# Patient Record
Sex: FEMALE | Race: WHITE | Employment: FULL TIME | URBAN - METROPOLITAN AREA
[De-identification: names, ages, dates, MRNs, and addresses within clinical notes are randomized per-mention and may not be internally consistent; named-entity substitution may affect disease eponyms.]

---

## 2017-01-16 ENCOUNTER — APPOINTMENT (OUTPATIENT)
Dept: GENERAL RADIOLOGY | Age: 47
End: 2017-01-16
Attending: EMERGENCY MEDICINE
Payer: COMMERCIAL

## 2017-01-16 ENCOUNTER — HOSPITAL ENCOUNTER (EMERGENCY)
Age: 47
Discharge: HOME | End: 2017-01-16
Attending: EMERGENCY MEDICINE
Payer: COMMERCIAL

## 2017-01-16 VITALS
OXYGEN SATURATION: 98 % | RESPIRATION RATE: 16 BRPM | BODY MASS INDEX: 22.49 KG/M2 | TEMPERATURE: 98 F | SYSTOLIC BLOOD PRESSURE: 142 MMHG | HEIGHT: 65 IN | WEIGHT: 135 LBS | HEART RATE: 95 BPM | DIASTOLIC BLOOD PRESSURE: 74 MMHG

## 2017-01-16 DIAGNOSIS — S80.02XA CONTUSION OF LEFT KNEE, INITIAL ENCOUNTER: ICD-10-CM
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
## 2017-01-16 DIAGNOSIS — S70.02XA CONTUSION OF LEFT HIP, INITIAL ENCOUNTER: ICD-10-CM

## 2017-01-16 PROCEDURE — 73502 X-RAY EXAM HIP UNI 2-3 VIEWS: CPT

## 2017-01-16 PROCEDURE — 73560 X-RAY EXAM OF KNEE 1 OR 2: CPT

## 2017-01-16 PROCEDURE — 99283 EMERGENCY DEPT VISIT LOW MDM: CPT

## 2017-01-16 RX ORDER — TRAMADOL HYDROCHLORIDE 50 MG/1
100 TABLET ORAL ONCE
Status: COMPLETED | OUTPATIENT
Start: 2017-01-16 | End: 2017-01-16

## 2017-01-16 RX ORDER — TRAMADOL HYDROCHLORIDE 50 MG/1
TABLET ORAL EVERY 4 HOURS PRN
Qty: 12 TABLET | Refills: 0 | Status: SHIPPED | OUTPATIENT
Start: 2017-01-16 | End: 2017-03-22

## 2017-01-16 NOTE — ED PROVIDER NOTES
Patient Seen in: Replaced by [facility] Emergency Department In [place]    History   Patient presents with:  Trauma (cardiovascular, musculoskeletal)    Stated Complaint: s/p fall left hip/leg pain    HPI    Patient is a 49-year-old woman presents the emergency room. nightly. Mometasone Furoate (NASONEX) 50 MCG/ACT Nasal Suspension,  2 sprays each nostril once daily   Clobetasol Propionate 0.05 % Apply Externally Liquid,  Apply  topically daily as needed.    Hydrocortisone Valerate (WESTCORT) 0.2 % External Cream, reviewed  MDM     Post fall with left hip and knee pain. Symptomatic care. Short supply of Ultram.  Follow-up with primary care.       Disposition and Plan     Clinical Impression:  Fall, initial encounter  (primary encounter diagnosis)  Contusion of left

## 2017-01-16 NOTE — ED INITIAL ASSESSMENT (HPI)
Pt states she fell on left side of her body on Friday. Pt states the pain continues to worsen to left hip leg.

## 2017-01-31 ENCOUNTER — HOSPITAL ENCOUNTER (OUTPATIENT)
Dept: MRI IMAGING | Age: 47
Discharge: HOME | End: 2017-01-31
Attending: FAMILY MEDICINE
Payer: COMMERCIAL

## 2017-01-31 DIAGNOSIS — M79.605 LEG PAIN, LEFT: ICD-10-CM

## 2017-01-31 PROCEDURE — 72148 MRI LUMBAR SPINE W/O DYE: CPT

## 2017-02-08 ENCOUNTER — TELEPHONE (OUTPATIENT)
Dept: SURGERY | Facility: CLINIC | Age: 47
End: 2017-02-08

## 2017-02-13 ENCOUNTER — HOSPITAL ENCOUNTER (OUTPATIENT)
Dept: MRI IMAGING | Age: 47
Discharge: HOME | End: 2017-02-13
Attending: FAMILY MEDICINE
Payer: COMMERCIAL

## 2017-02-13 DIAGNOSIS — R52 PAIN, UNSPECIFIED: ICD-10-CM

## 2017-02-13 PROCEDURE — 73718 MRI LOWER EXTREMITY W/O DYE: CPT

## 2017-02-14 NOTE — TELEPHONE ENCOUNTER
Informed pt still waiting for office notes and imaging reports from Dr Jodi Luna for Dr Anastacio Oneal to review for NP appt, pt understood and will have notes faxed

## 2017-02-19 ENCOUNTER — HOSPITAL ENCOUNTER (OUTPATIENT)
Dept: MRI IMAGING | Age: 47
Discharge: HOME | End: 2017-02-19
Attending: FAMILY MEDICINE
Payer: COMMERCIAL

## 2017-02-19 DIAGNOSIS — R52 PAIN, UNSPECIFIED: ICD-10-CM

## 2017-02-19 PROCEDURE — 73721 MRI JNT OF LWR EXTRE W/O DYE: CPT

## 2017-02-28 NOTE — TELEPHONE ENCOUNTER
Informed pt we have not received her records;also let pt know Dr Willam Jasmine had an unplanned Leave of Absence and we are unable to offer injections at this time-pt should check with her insurance or PCP for a list of providers who can accomodate her;pt understoo

## 2017-03-11 ENCOUNTER — HOSPITAL ENCOUNTER (EMERGENCY)
Facility: HOSPITAL | Age: 47
Discharge: HOME | End: 2017-03-12
Attending: EMERGENCY MEDICINE
Payer: COMMERCIAL

## 2017-03-11 ENCOUNTER — APPOINTMENT (OUTPATIENT)
Dept: GENERAL RADIOLOGY | Facility: HOSPITAL | Age: 47
End: 2017-03-11
Attending: EMERGENCY MEDICINE
Payer: COMMERCIAL

## 2017-03-11 ENCOUNTER — APPOINTMENT (OUTPATIENT)
Dept: CT IMAGING | Facility: HOSPITAL | Age: 47
End: 2017-03-11
Attending: EMERGENCY MEDICINE
Payer: COMMERCIAL
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
## 2017-03-11 DIAGNOSIS — S20.229A BACK CONTUSION, UNSPECIFIED LATERALITY, INITIAL ENCOUNTER: ICD-10-CM

## 2017-03-11 PROCEDURE — 99284 EMERGENCY DEPT VISIT MOD MDM: CPT

## 2017-03-11 PROCEDURE — 72072 X-RAY EXAM THORAC SPINE 3VWS: CPT

## 2017-03-11 PROCEDURE — 70450 CT HEAD/BRAIN W/O DYE: CPT

## 2017-03-11 PROCEDURE — 96374 THER/PROPH/DIAG INJ IV PUSH: CPT

## 2017-03-11 PROCEDURE — 72110 X-RAY EXAM L-2 SPINE 4/>VWS: CPT

## 2017-03-11 PROCEDURE — 72050 X-RAY EXAM NECK SPINE 4/5VWS: CPT

## 2017-03-11 PROCEDURE — 96361 HYDRATE IV INFUSION ADD-ON: CPT

## 2017-03-11 RX ORDER — HYDROMORPHONE HYDROCHLORIDE 1 MG/ML
1 INJECTION, SOLUTION INTRAMUSCULAR; INTRAVENOUS; SUBCUTANEOUS ONCE
Status: COMPLETED | OUTPATIENT
Start: 2017-03-11 | End: 2017-03-11

## 2017-03-12 VITALS
HEIGHT: 66 IN | OXYGEN SATURATION: 99 % | BODY MASS INDEX: 21.69 KG/M2 | DIASTOLIC BLOOD PRESSURE: 70 MMHG | WEIGHT: 135 LBS | TEMPERATURE: 98 F | RESPIRATION RATE: 17 BRPM | HEART RATE: 88 BPM | SYSTOLIC BLOOD PRESSURE: 108 MMHG

## 2017-03-12 RX ORDER — HYDROCODONE BITARTRATE AND ACETAMINOPHEN 5; 325 MG/1; MG/1
1-2 TABLET ORAL EVERY 4 HOURS PRN
Qty: 20 TABLET | Refills: 0 | Status: SHIPPED | OUTPATIENT
Start: 2017-03-12 | End: 2017-03-19

## 2017-03-12 NOTE — ED PROVIDER NOTES
Patient Seen in: [facility] Emergency Department    History   Patient presents with:  Fall (musculoskeletal, neurologic)    Stated Complaint: fall    HPI    70-year-old female status post fall on hardwood steps.   Patient fell 6 steps landing on her mouth nightly. Mometasone Furoate (NASONEX) 50 MCG/ACT Nasal Suspension,  2 sprays each nostril once daily   Clobetasol Propionate 0.05 % Apply Externally Liquid,  Apply  topically daily as needed.        Family History   Problem Relation Age of Onset There is no tenderness. Musculoskeletal: Normal range of motion. She exhibits tenderness. Patient complains of tenderness along the spine. Lymphadenopathy:     She has no cervical adenopathy.    Neurological: She is alert and oriented to person, place lateral, oblique, and coned down L5-S1 views were obtained. COMPARISON:  [address], MRI SPINE LUMBAR (CPT=72148), 1/31/2017, 8:55. [facility] , XR ROUTINE THORACIC SPINE (3 VIEWS) (CPT=72072), 3/11/2017, 23:13.   INDICATIONS:  fall  PATIENT STATED HISTORY: Dictated by: Lawson Sargent MD on 3/11/2017 at 23:52     Approved by: Lawson Sargent MD            Ct Brain Or Head ([id number])    3/11/2017  PROCEDURE:  CT BRAIN OR HEAD ([id number])  COMPARISON:  [facility] CT BRAIN OR HEAD ([id number]), 7/17/2015, 23:59.   INDICATIONS:  fall  AMBER lateral aspect of the distal femoral metaphysis that measures 8 mm in greatest dimension with high T2/low T1 signal. There is some mild internal architecture suggested suggesting internal chondroid matrix, likely representing a small enchondroma.  The blaze condyle is noted. SYNOVIUM:           Baker's cyst measures 1.5 x 1.1 x 2.2 cm. No significant joint effusion. 2/19/2017  CONCLUSION:   1. The menisci along with ligaments and tendons are intact.   2. Minimal fibrillation of the medial facet patellar

## 2017-03-12 NOTE — ED INITIAL ASSESSMENT (HPI)
Rashaun Mcdaniels from stairs 6 steps, landed on hardwood floor, claimed to have pain on back, hips,legs.  + dizziness, unable to tell if dizzy prior to fall or after \" passed out a little\"

## 2017-03-22 PROBLEM — F33.1 DEPRESSION, MAJOR, RECURRENT, MODERATE (HCC): Status: ACTIVE | Noted: 2017-03-22

## 2017-03-22 PROBLEM — M79.7 FIBROMYALGIA: Status: ACTIVE | Noted: 2017-03-22

## 2017-03-29 PROBLEM — M54.42 ACUTE LEFT-SIDED LOW BACK PAIN WITH LEFT-SIDED SCIATICA: Status: ACTIVE | Noted: 2017-03-29

## 2017-03-31 PROBLEM — M51.26 BULGE OF LUMBAR DISC WITHOUT MYELOPATHY: Status: ACTIVE | Noted: 2017-03-31

## 2017-03-31 PROBLEM — M54.16 LUMBAR RADICULITIS: Status: ACTIVE | Noted: 2017-03-31

## 2017-03-31 PROBLEM — M51.36 BULGE OF LUMBAR DISC WITHOUT MYELOPATHY: Status: ACTIVE | Noted: 2017-03-31

## 2017-06-23 ENCOUNTER — HOSPITAL ENCOUNTER (OUTPATIENT)
Age: 47
Discharge: HOME | End: 2017-06-23
Payer: COMMERCIAL

## 2017-06-23 VITALS
OXYGEN SATURATION: 99 % | DIASTOLIC BLOOD PRESSURE: 85 MMHG | BODY MASS INDEX: 22 KG/M2 | HEART RATE: 77 BPM | WEIGHT: 133 LBS | TEMPERATURE: 98 F | RESPIRATION RATE: 20 BRPM | SYSTOLIC BLOOD PRESSURE: 129 MMHG

## 2017-06-23 DIAGNOSIS — M54.59 INTRACTABLE LOW BACK PAIN: ICD-10-CM

## 2017-06-23 DIAGNOSIS — G89.29 CHRONIC BILATERAL LOW BACK PAIN WITH LEFT-SIDED SCIATICA: Primary | ICD-10-CM

## 2017-06-23 DIAGNOSIS — M54.42 CHRONIC BILATERAL LOW BACK PAIN WITH LEFT-SIDED SCIATICA: Primary | ICD-10-CM

## 2017-06-23 PROCEDURE — 96372 THER/PROPH/DIAG INJ SC/IM: CPT

## 2017-06-23 PROCEDURE — 99214 OFFICE O/P EST MOD 30 MIN: CPT

## 2017-06-23 RX ORDER — KETOROLAC TROMETHAMINE 30 MG/ML
30 INJECTION, SOLUTION INTRAMUSCULAR; INTRAVENOUS ONCE
Status: COMPLETED | OUTPATIENT
Start: 2017-06-23 | End: 2017-06-23

## 2017-06-23 NOTE — ED PROVIDER NOTES
Patient Seen in: THE [facility] In [facility]    History   Patient presents with:  Back Pain (musculoskeletal)    Stated Complaint: lower back pain x2days    HPI  Virginia Franz is a 55-year-old female who presents for evaluation of worsening of her chron HYDROcodone-acetaminophen 5-325 MG Oral Tab,  Take 1 tablet by mouth every 6 (six) hours as needed for Pain.    Hydrocortisone Valerate (WESTCORT) 0.2 % External Cream,     Zolpidem Tartrate ER (AMBIEN CR) 12.5 MG Oral Tab CR,  Take 12.5 mg by mouth nightly Temp(Src) 97.8 °F (36.6 °C) (Temporal)  Resp 20  Wt 60.328 kg  SpO2 99%        Physical Exam   Constitutional: She is oriented to person, place, and time. She appears well-developed and well-nourished. No distress. Patient is teary-eyed, sitting on cart. on her MRI done 10 days ago, this is an ongoing issue. Toradol is given in the office today. The patient states that she cannot take NSAIDs for long periods of time because it flares up her psoriasis.   She is instructed to remain active, do not lie in be

## 2017-06-23 NOTE — ED INITIAL ASSESSMENT (HPI)
Patient states she has a bulging disc in her back from a prior fall but today the pain is unbearable. Patient sates she can usually deal with the pain. Patient states she can't get in to see the doctor until Monday.

## 2017-09-29 ENCOUNTER — HOSPITAL ENCOUNTER (OUTPATIENT)
Age: 47
Discharge: HOME | End: 2017-09-29
Payer: COMMERCIAL

## 2017-09-29 VITALS
OXYGEN SATURATION: 95 % | TEMPERATURE: 98 F | HEART RATE: 95 BPM | RESPIRATION RATE: 18 BRPM | DIASTOLIC BLOOD PRESSURE: 83 MMHG | SYSTOLIC BLOOD PRESSURE: 113 MMHG

## 2017-09-29 DIAGNOSIS — M54.16 CHRONIC LUMBAR RADICULOPATHY: Primary | ICD-10-CM

## 2017-09-29 DIAGNOSIS — M54.16 LEFT LUMBAR RADICULOPATHY: ICD-10-CM

## 2017-09-29 PROCEDURE — 99214 OFFICE O/P EST MOD 30 MIN: CPT

## 2017-09-29 PROCEDURE — 96372 THER/PROPH/DIAG INJ SC/IM: CPT

## 2017-09-29 RX ORDER — KETOROLAC TROMETHAMINE 30 MG/ML
60 INJECTION, SOLUTION INTRAMUSCULAR; INTRAVENOUS ONCE
Status: COMPLETED | OUTPATIENT
Start: 2017-09-29 | End: 2017-09-29

## 2017-09-29 NOTE — ED INITIAL ASSESSMENT (HPI)
Pt has pain to her middle lower back which shoots to her left leg. Feels like her legs are going to give out. Has had pain for 6 months. Being followed by the pain clinic. Taking Norco which doesn't help.

## 2017-09-29 NOTE — ED PROVIDER NOTES
Patient Seen in: [address] In [facility]    History   Patient presents with:  Back Pain (musculoskeletal)    Stated Complaint: back pain     HPI    Jose Miguel Kay is a 49-year-old female who comes in today with an acute flare of her chronic low back complaint: back pain   Other systems are as noted in HPI. Constitutional and vital signs reviewed. All other systems reviewed and negative except as noted above. PSFH elements reviewed from today and agreed except as otherwise stated in HPI.     Ph satisfaction prior to discharge today.         Disposition and Plan     Clinical Impression:  Chronic lumbar radiculopathy  (primary encounter diagnosis)  Left lumbar radiculopathy    Disposition:  Discharge    Follow-up:  Nico Holly MD  [facility]

## 2017-10-20 ENCOUNTER — LAB SERVICES (OUTPATIENT)
Dept: OTHER | Age: 47
End: 2017-10-20

## 2017-10-20 ENCOUNTER — CHARTING TRANS (OUTPATIENT)
Dept: OTHER | Age: 47
End: 2017-10-20

## 2017-10-20 LAB — RAPID STREP GROUP A: NORMAL

## 2017-10-23 PROBLEM — M51.36 DDD (DEGENERATIVE DISC DISEASE), LUMBAR: Status: ACTIVE | Noted: 2017-10-23

## 2017-10-23 PROBLEM — M47.816 LUMBAR SPONDYLOSIS: Status: ACTIVE | Noted: 2017-10-23

## 2017-10-30 ENCOUNTER — HOSPITAL ENCOUNTER (EMERGENCY)
Age: 47
Discharge: HOME | End: 2017-10-30
Attending: EMERGENCY MEDICINE
Payer: COMMERCIAL

## 2017-10-30 VITALS
RESPIRATION RATE: 18 BRPM | HEIGHT: 65 IN | WEIGHT: 130 LBS | DIASTOLIC BLOOD PRESSURE: 78 MMHG | OXYGEN SATURATION: 97 % | BODY MASS INDEX: 21.66 KG/M2 | TEMPERATURE: 98 F | HEART RATE: 71 BPM | SYSTOLIC BLOOD PRESSURE: 122 MMHG

## 2017-10-30 DIAGNOSIS — M54.40 BACK PAIN OF LUMBAR REGION WITH SCIATICA: Primary | ICD-10-CM

## 2017-10-30 PROCEDURE — 96372 THER/PROPH/DIAG INJ SC/IM: CPT

## 2017-10-30 PROCEDURE — 99283 EMERGENCY DEPT VISIT LOW MDM: CPT

## 2017-10-30 RX ORDER — HYDROMORPHONE HYDROCHLORIDE 1 MG/ML
1 INJECTION, SOLUTION INTRAMUSCULAR; INTRAVENOUS; SUBCUTANEOUS ONCE
Status: COMPLETED | OUTPATIENT
Start: 2017-10-30 | End: 2017-10-30

## 2017-10-30 NOTE — ED INITIAL ASSESSMENT (HPI)
Pt c/o back pain chronic, worse in the last 48 hours, pt states she took 10/325 Norco at 0530, pt states pain and numbness down left leg.   Pt states she was \"at the pain clinic on Monday, received shots and they are different and never really worked\"

## 2017-10-30 NOTE — ED PROVIDER NOTES
Patient Seen in: [facility] Emergency Department In [place]    History   Patient presents with:  Back Pain (musculoskeletal)    Stated Complaint: back pain, sciatica    HPI    Patient is a 51-year-old female with history chronic back pain.   Patient has se negative except as noted above. PSFH elements reviewed from today and agreed except as otherwise stated in HPI.     Physical Exam   ED Triage Vitals [10/30/17 8105]  BP: 140/88  Pulse: 97  Resp: 16  Temp: 98.2 °F (36.8 °C)  Temp src: n/a  SpO2: 97 %  O2 specialist as discussed.       Disposition and Plan     Clinical Impression:  Back pain of lumbar region with sciatica  (primary encounter diagnosis)    Disposition:  Discharge    Follow-up:  Vitaly Madden MD  [address] [address]

## 2017-11-08 ENCOUNTER — APPOINTMENT (OUTPATIENT)
Dept: MRI IMAGING | Age: 47
End: 2017-11-08
Attending: EMERGENCY MEDICINE
Payer: COMMERCIAL

## 2017-11-08 ENCOUNTER — HOSPITAL ENCOUNTER (EMERGENCY)
Age: 47
Discharge: HOME | End: 2017-11-08
Attending: EMERGENCY MEDICINE
Payer: COMMERCIAL

## 2017-11-08 VITALS
WEIGHT: 130 LBS | SYSTOLIC BLOOD PRESSURE: 122 MMHG | OXYGEN SATURATION: 100 % | BODY MASS INDEX: 21.66 KG/M2 | TEMPERATURE: 97 F | DIASTOLIC BLOOD PRESSURE: 68 MMHG | HEART RATE: 66 BPM | HEIGHT: 65 IN | RESPIRATION RATE: 16 BRPM

## 2017-11-08 DIAGNOSIS — M54.42 ACUTE MIDLINE LOW BACK PAIN WITH LEFT-SIDED SCIATICA: Primary | ICD-10-CM

## 2017-11-08 PROCEDURE — 96374 THER/PROPH/DIAG INJ IV PUSH: CPT

## 2017-11-08 PROCEDURE — 96376 TX/PRO/DX INJ SAME DRUG ADON: CPT

## 2017-11-08 PROCEDURE — 85025 COMPLETE CBC W/AUTO DIFF WBC: CPT | Performed by: EMERGENCY MEDICINE

## 2017-11-08 PROCEDURE — 99284 EMERGENCY DEPT VISIT MOD MDM: CPT

## 2017-11-08 PROCEDURE — 72158 MRI LUMBAR SPINE W/O & W/DYE: CPT | Performed by: EMERGENCY MEDICINE

## 2017-11-08 PROCEDURE — 96375 TX/PRO/DX INJ NEW DRUG ADDON: CPT

## 2017-11-08 PROCEDURE — A9575 INJ GADOTERATE MEGLUMI 0.1ML: HCPCS | Performed by: EMERGENCY MEDICINE

## 2017-11-08 PROCEDURE — 80048 BASIC METABOLIC PNL TOTAL CA: CPT | Performed by: EMERGENCY MEDICINE

## 2017-11-08 PROCEDURE — 85730 THROMBOPLASTIN TIME PARTIAL: CPT | Performed by: EMERGENCY MEDICINE

## 2017-11-08 RX ORDER — HYDROMORPHONE HYDROCHLORIDE 1 MG/ML
1 INJECTION, SOLUTION INTRAMUSCULAR; INTRAVENOUS; SUBCUTANEOUS EVERY 30 MIN PRN
Status: DISCONTINUED | OUTPATIENT
Start: 2017-11-08 | End: 2017-11-08

## 2017-11-08 RX ORDER — ORPHENADRINE CITRATE 30 MG/ML
60 INJECTION INTRAMUSCULAR; INTRAVENOUS ONCE
Status: COMPLETED | OUTPATIENT
Start: 2017-11-08 | End: 2017-11-08

## 2017-11-08 NOTE — ED PROVIDER NOTES
Patient is known to have bulging disc and lumbar foraminal stenosis and left lumbar radiculitis. 2 days ago, patient underwent lumbar transforaminal epidural steroid injection with fluoroscopic guidance L4 and L5 left.   Patient complains of increased pain significant changes since prior exam. There is no significant spinal canal or neural foraminal stenosis. The left paracentral disc bulge at L4-L5 is effacing left lateral recess and may be abutting the left L5 nerve root. No evidence of a fluid collection.

## 2017-11-08 NOTE — ED INITIAL ASSESSMENT (HPI)
Chronic back pain. Had cortizone injections Monday, states she believes she is having a flare up from the injections.  C/o intermittent left leg numbness

## 2017-11-08 NOTE — ED PROVIDER NOTES
Patient Seen in: [address] [facility] In [place]    History   Patient presents with:  Back Pain (musculoskeletal)    Stated Complaint: Back pain    HPI    Patient is a 71-year-old female with a well-documented history of chronic lower back pain are intact, normal conjunctival  ENT: Atraumatic  Lung: No distress, RR, no retraction, breath sounds are clear bilaterally  Cardio: Regular rate and rhythm, normal S1-S2, no murmur appreciable  Extremities: 2 point discrimination intact to lower extremiti including fat suppression sequences. Images acquired in sagittal and axial planes. 12 cc of intravenous gadolinium was administered followed by multiplanar post-infusion T1 weighted sequences.    FINDINGS: Alignment of the lumbar spine is normal. Vertebral be performed to rule out hematoma development. Patient will receive analgesics. Bilateral lower extremity DTRs are within normal limits  2 point discrimination to the lower extremities is intact. Strong dorsal pedis pulse.   No erythema or visual abnorma

## 2017-11-10 ENCOUNTER — HOSPITAL ENCOUNTER (EMERGENCY)
Age: 47
Discharge: HOME | End: 2017-11-10
Attending: EMERGENCY MEDICINE
Payer: COMMERCIAL

## 2017-11-10 VITALS
TEMPERATURE: 97 F | WEIGHT: 130 LBS | DIASTOLIC BLOOD PRESSURE: 80 MMHG | SYSTOLIC BLOOD PRESSURE: 108 MMHG | RESPIRATION RATE: 16 BRPM | BODY MASS INDEX: 21.66 KG/M2 | OXYGEN SATURATION: 99 % | HEIGHT: 65 IN | HEART RATE: 60 BPM

## 2017-11-10 DIAGNOSIS — M54.50 ACUTE LEFT-SIDED LOW BACK PAIN WITHOUT SCIATICA: Primary | ICD-10-CM

## 2017-11-10 PROCEDURE — 99284 EMERGENCY DEPT VISIT MOD MDM: CPT

## 2017-11-10 PROCEDURE — 96372 THER/PROPH/DIAG INJ SC/IM: CPT

## 2017-11-10 RX ORDER — PREDNISONE 20 MG/1
60 TABLET ORAL ONCE
Status: COMPLETED | OUTPATIENT
Start: 2017-11-10 | End: 2017-11-10

## 2017-11-10 RX ORDER — METHYLPREDNISOLONE SODIUM SUCCINATE 125 MG/2ML
125 INJECTION, POWDER, LYOPHILIZED, FOR SOLUTION INTRAMUSCULAR; INTRAVENOUS ONCE
Status: DISCONTINUED | OUTPATIENT
Start: 2017-11-10 | End: 2017-11-10

## 2017-11-10 RX ORDER — DIAZEPAM 5 MG/1
5 TABLET ORAL ONCE
Status: COMPLETED | OUTPATIENT
Start: 2017-11-10 | End: 2017-11-10

## 2017-11-10 RX ORDER — LIDOCAINE 50 MG/G
1 PATCH TOPICAL EVERY 24 HOURS
Qty: 6 PATCH | Refills: 0 | Status: SHIPPED | OUTPATIENT
Start: 2017-11-10 | End: 2018-01-15 | Stop reason: ALTCHOICE

## 2017-11-10 RX ORDER — HYDROMORPHONE HYDROCHLORIDE 1 MG/ML
1 INJECTION, SOLUTION INTRAMUSCULAR; INTRAVENOUS; SUBCUTANEOUS ONCE
Status: COMPLETED | OUTPATIENT
Start: 2017-11-10 | End: 2017-11-10

## 2017-11-10 RX ORDER — KETOROLAC TROMETHAMINE 30 MG/ML
30 INJECTION, SOLUTION INTRAMUSCULAR; INTRAVENOUS ONCE
Status: COMPLETED | OUTPATIENT
Start: 2017-11-10 | End: 2017-11-10

## 2017-11-10 NOTE — ED INITIAL ASSESSMENT (HPI)
Pt has history of lower back pain. Original injury in January. Had cortisone injection Monday, seen in the ED Wednesday for pain, and back in the ED today.

## 2017-11-10 NOTE — ED PROVIDER NOTES
Patient Seen in: THE [facility] Emergency Department In [place]    History   Patient presents with:  Back Pain (musculoskeletal)    Stated Complaint: lower back pain, seen earlier for pain, no better    HPI    Patient is a 12-year-old female with a well-docum 60   Temp (!) 97.1 °F (36.2 °C) (Oral)   Resp 16   Ht 165.1 cm (5' 5\")   Wt 59 kg   SpO2 99%   BMI 21.63 kg/m²         Physical Exam    Gen: Well appearing, well groomed, alert and aware x 3  Neck: Supple, full range of motion, no thyromegaly or lymphaden Clinical Impression:  Acute left-sided low back pain without sciatica  (primary encounter diagnosis)    Disposition:  Discharge  11/10/2017  4:26 pm    Follow-up:  No follow-up provider specified.       Medications Prescribed:  Discharge Medication List

## 2018-04-15 ENCOUNTER — HOSPITAL ENCOUNTER (EMERGENCY)
Age: 48
Discharge: HOME | End: 2018-04-15
Attending: EMERGENCY MEDICINE
Payer: COMMERCIAL

## 2018-04-15 VITALS
RESPIRATION RATE: 16 BRPM | TEMPERATURE: 98 F | OXYGEN SATURATION: 99 % | DIASTOLIC BLOOD PRESSURE: 67 MMHG | HEIGHT: 66 IN | BODY MASS INDEX: 22.02 KG/M2 | WEIGHT: 137 LBS | HEART RATE: 78 BPM | SYSTOLIC BLOOD PRESSURE: 107 MMHG

## 2018-04-15 DIAGNOSIS — M54.42 CHRONIC LEFT-SIDED LOW BACK PAIN WITH LEFT-SIDED SCIATICA: Primary | ICD-10-CM

## 2018-04-15 DIAGNOSIS — G89.29 CHRONIC LEFT-SIDED LOW BACK PAIN WITH LEFT-SIDED SCIATICA: Primary | ICD-10-CM

## 2018-04-15 PROCEDURE — 96375 TX/PRO/DX INJ NEW DRUG ADDON: CPT

## 2018-04-15 PROCEDURE — 99284 EMERGENCY DEPT VISIT MOD MDM: CPT

## 2018-04-15 PROCEDURE — 96374 THER/PROPH/DIAG INJ IV PUSH: CPT

## 2018-04-15 RX ORDER — MORPHINE SULFATE 4 MG/ML
4 INJECTION, SOLUTION INTRAMUSCULAR; INTRAVENOUS ONCE
Status: COMPLETED | OUTPATIENT
Start: 2018-04-15 | End: 2018-04-15

## 2018-04-15 RX ORDER — LORAZEPAM 2 MG/ML
0.5 INJECTION INTRAMUSCULAR ONCE
Status: COMPLETED | OUTPATIENT
Start: 2018-04-15 | End: 2018-04-15

## 2018-04-15 NOTE — ED INITIAL ASSESSMENT (HPI)
Has chronic low back pain, sees pain specialist at [facility] ( dr Irena Roberts) . Scheduled for injection 4/23.  Increased pain since Wednesday, states she called pain MD and appt on Monday but pain is unbearable

## 2018-04-15 NOTE — ED PROVIDER NOTES
Patient Seen in: [facility] Emergency Department In [place]    History   Patient presents with:  Back Pain (musculoskeletal)    Stated Complaint: back pain, sees pain specialist    HPI    This is a 40-year-old female with fibromyalgia, depression, chronic Exam    GENERAL: Awake, alert oriented x3, nontoxic appearing. SKIN: Normal, warm, and dry. HEENT:  Pupils equally round and reactive to light. Conjuctiva clear.   Oropharynx is clear and moist.   Lungs: Clear to auscultation bilaterally with no rales, n 4/15/2018  6:06 AM

## 2018-06-12 ENCOUNTER — HOSPITAL ENCOUNTER (EMERGENCY)
Age: 48
Discharge: HOME | End: 2018-06-12
Attending: EMERGENCY MEDICINE
Payer: COMMERCIAL

## 2018-06-12 ENCOUNTER — APPOINTMENT (OUTPATIENT)
Dept: MRI IMAGING | Age: 48
End: 2018-06-12
Attending: EMERGENCY MEDICINE
Payer: COMMERCIAL

## 2018-06-12 VITALS
DIASTOLIC BLOOD PRESSURE: 79 MMHG | SYSTOLIC BLOOD PRESSURE: 111 MMHG | HEART RATE: 59 BPM | OXYGEN SATURATION: 98 % | BODY MASS INDEX: 21.69 KG/M2 | TEMPERATURE: 99 F | HEIGHT: 66 IN | RESPIRATION RATE: 16 BRPM | WEIGHT: 135 LBS

## 2018-06-12 DIAGNOSIS — G89.29 CHRONIC RADICULAR LUMBAR PAIN: Primary | ICD-10-CM

## 2018-06-12 DIAGNOSIS — M54.16 CHRONIC RADICULAR LUMBAR PAIN: Primary | ICD-10-CM

## 2018-06-12 PROCEDURE — 96374 THER/PROPH/DIAG INJ IV PUSH: CPT

## 2018-06-12 PROCEDURE — 96361 HYDRATE IV INFUSION ADD-ON: CPT

## 2018-06-12 PROCEDURE — A9576 INJ PROHANCE MULTIPACK: HCPCS | Performed by: EMERGENCY MEDICINE

## 2018-06-12 PROCEDURE — 96375 TX/PRO/DX INJ NEW DRUG ADDON: CPT

## 2018-06-12 PROCEDURE — 99284 EMERGENCY DEPT VISIT MOD MDM: CPT

## 2018-06-12 PROCEDURE — 72158 MRI LUMBAR SPINE W/O & W/DYE: CPT | Performed by: EMERGENCY MEDICINE

## 2018-06-12 PROCEDURE — 85025 COMPLETE CBC W/AUTO DIFF WBC: CPT | Performed by: EMERGENCY MEDICINE

## 2018-06-12 PROCEDURE — 80048 BASIC METABOLIC PNL TOTAL CA: CPT | Performed by: EMERGENCY MEDICINE

## 2018-06-12 RX ORDER — HYDROMORPHONE HYDROCHLORIDE 1 MG/ML
1 INJECTION, SOLUTION INTRAMUSCULAR; INTRAVENOUS; SUBCUTANEOUS EVERY 30 MIN PRN
Status: DISCONTINUED | OUTPATIENT
Start: 2018-06-12 | End: 2018-06-12

## 2018-06-12 RX ORDER — DIPHENHYDRAMINE HYDROCHLORIDE 50 MG/ML
50 INJECTION INTRAMUSCULAR; INTRAVENOUS ONCE
Status: COMPLETED | OUTPATIENT
Start: 2018-06-12 | End: 2018-06-12

## 2018-06-12 RX ORDER — DEXTROAMPHETAMINE SACCHARATE, AMPHETAMINE ASPARTATE, DEXTROAMPHETAMINE SULFATE AND AMPHETAMINE SULFATE 2.5; 2.5; 2.5; 2.5 MG/1; MG/1; MG/1; MG/1
10 TABLET ORAL 3 TIMES DAILY
COMMUNITY
End: 2019-05-03

## 2018-06-12 RX ORDER — LAMOTRIGINE 25 MG/1
50 TABLET ORAL DAILY
COMMUNITY
End: 2019-05-03

## 2018-06-12 RX ORDER — HYDROCODONE BITARTRATE AND ACETAMINOPHEN 10; 325 MG/1; MG/1
1 TABLET ORAL EVERY 6 HOURS PRN
COMMUNITY

## 2018-06-12 RX ORDER — METOCLOPRAMIDE HYDROCHLORIDE 5 MG/ML
10 INJECTION INTRAMUSCULAR; INTRAVENOUS ONCE
Status: COMPLETED | OUTPATIENT
Start: 2018-06-12 | End: 2018-06-12

## 2018-06-12 RX ORDER — LORAZEPAM 2 MG/ML
2 INJECTION INTRAMUSCULAR ONCE
Status: COMPLETED | OUTPATIENT
Start: 2018-06-12 | End: 2018-06-12

## 2018-06-12 RX ORDER — BUSPIRONE HYDROCHLORIDE 10 MG/1
10 TABLET ORAL 3 TIMES DAILY
COMMUNITY

## 2018-06-12 RX ORDER — ONDANSETRON 8 MG/1
8 TABLET, ORALLY DISINTEGRATING ORAL EVERY 6 HOURS PRN
Qty: 10 TABLET | Refills: 0 | Status: SHIPPED | OUTPATIENT
Start: 2018-06-12 | End: 2018-06-19

## 2018-06-12 NOTE — ED PROVIDER NOTES
Patient Seen in: [facility] Emergency Department In [place]    History   Patient presents with:  Back Pain (musculoskeletal)    Stated Complaint: back pain    HPI    Patient was to the emergency department 2 months ago with complaints of back pain.   She h stated complaint: back pain  Other systems are as noted in HPI. Constitutional and vital signs reviewed. All other systems reviewed and negative except as noted above.     Physical Exam   ED Triage Vitals [06/12/18 1753]  BP: 130/82  Pulse: 77  Resp: view results for these tests on the individual orders.    CBC W/ DIFFERENTIAL       ED Course as of Jun 12 2027  ------------------------------------------------------------      MDM   Patient complains of pain more intense than radicular pains that she has List    START taking these medications    !! ondansetron 8 MG Oral Tablet Dispersible  Take 1 tablet (8 mg total) by mouth every 6 (six) hours as needed. Qty: 10 tablet Refills: 0    !! - Potential duplicate medications found.  Please discuss with provider

## 2018-06-12 NOTE — ED INITIAL ASSESSMENT (HPI)
Pt states she had an injection in her back yesterday for chronic pain and states the injection has made her pain worse. Pain starts in her lower back and radiates into her left leg.

## 2018-08-04 ENCOUNTER — HOSPITAL ENCOUNTER (EMERGENCY)
Age: 48
Discharge: NURSING, REHAB OR LONG-TERM CARE | End: 2018-08-04
Attending: EMERGENCY MEDICINE
Payer: COMMERCIAL

## 2018-08-04 VITALS
DIASTOLIC BLOOD PRESSURE: 84 MMHG | HEIGHT: 66 IN | WEIGHT: 132.25 LBS | HEART RATE: 77 BPM | BODY MASS INDEX: 21.25 KG/M2 | OXYGEN SATURATION: 100 % | TEMPERATURE: 98 F | SYSTOLIC BLOOD PRESSURE: 125 MMHG | RESPIRATION RATE: 16 BRPM

## 2018-08-04 DIAGNOSIS — F32.A DEPRESSION, UNSPECIFIED DEPRESSION TYPE: Primary | ICD-10-CM

## 2018-08-04 LAB
AMPHET UR QL SCN: NEGATIVE
ANION GAP SERPL CALC-SCNC: 10 MMOL/L (ref 0–18)
APAP SERPL-MCNC: <2 UG/ML (ref ?–2)
ATRIAL RATE: 76 BPM
BARBITURATES UR QL SCN: NEGATIVE
BASOPHILS # BLD AUTO: 0.04 X10(3) UL (ref 0–0.1)
BASOPHILS NFR BLD AUTO: 0.6 %
BENZODIAZ UR QL SCN: NEGATIVE
BILIRUB UR QL STRIP.AUTO: NEGATIVE
BUN BLD-MCNC: 11 MG/DL (ref 8–20)
BUN/CREAT SERPL: 13.8 (ref 10–20)
CALCIUM BLD-MCNC: 9.3 MG/DL (ref 8.3–10.3)
CANNABINOIDS UR QL SCN: NEGATIVE
CHLORIDE SERPL-SCNC: 105 MMOL/L (ref 101–111)
CLARITY UR REFRACT.AUTO: CLEAR
CO2 SERPL-SCNC: 23 MMOL/L (ref 22–32)
COCAINE UR QL: NEGATIVE
COLOR UR AUTO: YELLOW
CREAT BLD-MCNC: 0.8 MG/DL (ref 0.55–1.02)
EOSINOPHIL # BLD AUTO: 0.09 X10(3) UL (ref 0–0.3)
EOSINOPHIL NFR BLD AUTO: 1.4 %
ERYTHROCYTE [DISTWIDTH] IN BLOOD BY AUTOMATED COUNT: 12.8 % (ref 11.5–16)
ETHYL ALCOHOL: 121 MG/DL (ref ?–3)
GLUCOSE BLD-MCNC: 83 MG/DL (ref 70–99)
GLUCOSE UR STRIP.AUTO-MCNC: NEGATIVE MG/DL
HCT VFR BLD AUTO: 38.1 % (ref 34–50)
HGB BLD-MCNC: 12.7 G/DL (ref 12–16)
IMMATURE GRANULOCYTE COUNT: 0.01 X10(3) UL (ref 0–1)
IMMATURE GRANULOCYTE RATIO %: 0.2 %
KETONES UR STRIP.AUTO-MCNC: NEGATIVE MG/DL
LYMPHOCYTES # BLD AUTO: 2.49 X10(3) UL (ref 0.9–4)
LYMPHOCYTES NFR BLD AUTO: 38.6 %
MCH RBC QN AUTO: 29.7 PG (ref 27–33.2)
MCHC RBC AUTO-ENTMCNC: 33.3 G/DL (ref 31–37)
MCV RBC AUTO: 89.2 FL (ref 81–100)
MONOCYTES # BLD AUTO: 0.61 X10(3) UL (ref 0.1–1)
MONOCYTES NFR BLD AUTO: 9.5 %
NEUTROPHIL ABS PRELIM: 3.21 X10 (3) UL (ref 1.3–6.7)
NEUTROPHILS # BLD AUTO: 3.21 X10(3) UL (ref 1.3–6.7)
NEUTROPHILS NFR BLD AUTO: 49.7 %
NITRITE UR QL STRIP.AUTO: NEGATIVE
OPIATE URINE: NEGATIVE
OSMOLALITY SERPL CALC.SUM OF ELEC: 285 MOSM/KG (ref 275–295)
P AXIS: 51 DEGREES
P-R INTERVAL: 144 MS
PCP URINE: NEGATIVE
PH UR STRIP.AUTO: 6 [PH] (ref 4.5–8)
PLATELET # BLD AUTO: 293 10(3)UL (ref 150–450)
POCT LOT NUMBER: NORMAL
POCT URINE PREGNANCY: NEGATIVE
POTASSIUM SERPL-SCNC: 3.3 MMOL/L (ref 3.6–5.1)
PROCEDURE CONTROL: PRESENT
PROT UR STRIP.AUTO-MCNC: NEGATIVE MG/DL
Q-T INTERVAL: 414 MS
QRS DURATION: 86 MS
QTC CALCULATION (BEZET): 465 MS
R AXIS: -11 DEGREES
RBC # BLD AUTO: 4.27 X10(6)UL (ref 3.8–5.1)
RBC UR QL AUTO: NEGATIVE
RED CELL DISTRIBUTION WIDTH-SD: 41.7 FL (ref 35.1–46.3)
SALICYLATES SERPL-MCNC: <1.7 MG/DL (ref ?–1.7)
SODIUM SERPL-SCNC: 138 MMOL/L (ref 136–144)
SP GR UR STRIP.AUTO: <=1.005 (ref 1–1.03)
T AXIS: 33 DEGREES
UROBILINOGEN UR STRIP.AUTO-MCNC: 0.2 MG/DL
VENTRICULAR RATE: 76 BPM
WBC # BLD AUTO: 6.5 X10(3) UL (ref 4–13)

## 2018-08-04 PROCEDURE — 85025 COMPLETE CBC W/AUTO DIFF WBC: CPT | Performed by: EMERGENCY MEDICINE

## 2018-08-04 PROCEDURE — 80048 BASIC METABOLIC PNL TOTAL CA: CPT | Performed by: EMERGENCY MEDICINE

## 2018-08-04 PROCEDURE — 81001 URINALYSIS AUTO W/SCOPE: CPT | Performed by: EMERGENCY MEDICINE

## 2018-08-04 PROCEDURE — 81025 URINE PREGNANCY TEST: CPT

## 2018-08-04 PROCEDURE — 80320 DRUG SCREEN QUANTALCOHOLS: CPT | Performed by: EMERGENCY MEDICINE

## 2018-08-04 PROCEDURE — 80329 ANALGESICS NON-OPIOID 1 OR 2: CPT | Performed by: EMERGENCY MEDICINE

## 2018-08-04 PROCEDURE — 87086 URINE CULTURE/COLONY COUNT: CPT | Performed by: EMERGENCY MEDICINE

## 2018-08-04 PROCEDURE — 93010 ELECTROCARDIOGRAM REPORT: CPT

## 2018-08-04 PROCEDURE — 80307 DRUG TEST PRSMV CHEM ANLYZR: CPT | Performed by: EMERGENCY MEDICINE

## 2018-08-04 PROCEDURE — 93005 ELECTROCARDIOGRAM TRACING: CPT

## 2018-08-04 PROCEDURE — 99285 EMERGENCY DEPT VISIT HI MDM: CPT

## 2018-08-04 PROCEDURE — 36415 COLL VENOUS BLD VENIPUNCTURE: CPT

## 2018-08-04 RX ORDER — POTASSIUM CHLORIDE 20 MEQ/1
40 TABLET, EXTENDED RELEASE ORAL ONCE
Status: DISCONTINUED | OUTPATIENT
Start: 2018-08-04 | End: 2018-08-04

## 2018-08-04 RX ORDER — DIPHENHYDRAMINE HYDROCHLORIDE 50 MG/ML
50 INJECTION INTRAMUSCULAR; INTRAVENOUS EVERY 4 HOURS PRN
Status: DISCONTINUED | OUTPATIENT
Start: 2018-08-04 | End: 2018-08-04

## 2018-08-04 RX ORDER — LORAZEPAM 1 MG/1
2 TABLET ORAL ONCE
Status: COMPLETED | OUTPATIENT
Start: 2018-08-04 | End: 2018-08-04

## 2018-08-04 RX ORDER — HALOPERIDOL 5 MG/ML
5 INJECTION INTRAMUSCULAR ONCE
Status: DISCONTINUED | OUTPATIENT
Start: 2018-08-04 | End: 2018-08-04

## 2018-08-04 NOTE — ED PROVIDER NOTES
Patient did share with the nurse that her attempts to cut herself with a knife was a suicide attempt and that she still wanted to die upon arrival.  With this information the psychiatrist felt it was best that she be admitted to the hospital especially sin was offered 2 mg of lorazepam.  She did take that orally but continues to cry and occasionally yell in the room- benadryl and Haldol are appropriate at this time as patient is visually escalating and scratching at her arms.

## 2018-08-04 NOTE — ED PROVIDER NOTES
Patient Seen in: [address] [facility] In [place]    History   Patient presents with:  Trauma (cardiovascular, musculoskeletal)    Stated Complaint: Attempted suicide/slash wrist    HPI    Patient with history of depression brought for evaluatio 167.6 cm (5' 6\")   Wt 60 kg   SpO2 92%   BMI 21.35 kg/m²         Physical Exam  Somewhat disheveled, crying inconsolably. Not making eye contact. Nonverbal.  Skin: Warm and dry without cyanosis or pallor  HEENT: Atraumatic.   Pupils equal round reactive T-wave changes. Normal EKG. ED Course as of Aug 04 0503  ------------------------------------------------------------      [facility]   Patient with depression, apparent suicide attempt tonight.   Certificate for involuntary admission to psychiatric facil

## 2018-08-04 NOTE — ED NOTES
Arrives for suicidal attempt, tried to cut wrists with kitchen knife but was stopped by son.  When asked upon arrival if she planned this out she said no it was impulsive but when asked if she still wanted to hurt herself or kill herself she states yes

## 2018-08-04 NOTE — ED NOTES
[facility] returned call, Krys Fregoso  will be out here at 0500. Updated pt and family on plan of care, she immediatley states \"i'm not going there\". Informed pt and family that there needs to be an assessment done as this was a serious act.  Pt and mother state

## 2018-08-04 NOTE — BH LEVEL OF CARE ASSESSMENT
Level of Care Assessment Note    General Questions  Why are you here?: \"I'm just really depressed. \"  Precipitating Events: Pt presented as tearful stating that she has been feeling depressed for awhile.   She stated that she got back together with her son person  1. Have you wished you were dead or wished you could go to sleep and not wake up? (past 30 days): Yes  2. Have you actually had any thoughts of killing yourself? (past 30 days): Yes  3.  Have you been thinking about how you might kill yourself? (pas No  Discussion of Removal of Firearm/Weapon: NA  Do you have a firearm owner ID card?: No  Collateral for any access to means/firearms/weapons:  Mother and son-Denies access to weapons     Self Injury  History of Self Injurious Behaviors: No  Present Self-I Symptoms: Other (Comment)  Bipolar Description: Pt stated that she has a hx of bipolar 2 diagnosis but states that she does not know what that means.     Sleep Pattern: Difficulty falling asleep  Number of Sleep Hours: 4 Hours  Use of Sleep Aids: trazadone, past symptoms  Last Withdrawal Episode: NA  Current Withdrawal Symptoms: No  Breathalyzer:  (121 approx 02:20 am )    Compulsive Behaviors  Are you/others concerned about any of the following behaviors over the past 30 days?: Denies by: poor insight into behavior and consequences   Judgment: Poor  Fair/poor judgment as evidenced by: attempting to harm self   Thought Patterns  Clarity/Relevance: Coherent;Logical;Relevant to topic  Flow: Organized;Guarded; Tangential  Content: Ordinary Pt's  mother was stating that \"it is your fault\" if she loses her job. Pt was observed by ED staff punching herself while in the ED room. Family was asked to leave due to mother threatening to push RN and for being argumentative and disruptive.   Pt had Suicide  Medical Precautions: None

## 2018-08-04 NOTE — ED NOTES
PT SLEEPING. CALLED [facility] TO LET THEM KNOW ALEXIAN BROTHERS STILL HAVE NOT CALLED.   WILL CONTINUE TO MONITOR

## 2018-08-04 NOTE — ED NOTES
PT EASILY AROUSABLE AND STATES SHE DOES NOT WANT ANYTHING TO EAT OR DRINK NOR DOES SHE HAVE TO USE THE BATHROOM.   PT FELL BACK TO SLEEP

## 2018-08-04 NOTE — ED NOTES
Mother approached this RN and asked if she could have pt purse as it has money in it. Pt agrees but I informed her that as long as she is in the room with pt, no belongings will be in the room with him.  Mother became very irate and asked when I was going t

## 2018-08-04 NOTE — ED NOTES
Still attempting to transfer, called 7 hospitals, no beds , [facility] reviewing packet.

## 2018-08-04 NOTE — ED INITIAL ASSESSMENT (HPI)
Per family, pt came home from work very upset and crying because it was her birthday and she had a bad day at work, no one acknowledged her birthday and they just yelled at her.  She would not talk to mother or son, and tried to cut her wrists with a kitche

## 2018-08-04 NOTE — ED NOTES
Pt agitated, tearful, and scratching right arm. MD aware and ordered meds to be given. Pt stops scratching when she doesn't see anyone looking at her.

## 2018-08-04 NOTE — ED NOTES
Hourly rounding completed. Updated on plan of care. Awaiting [facility]   Pt became upset about her dose of ortezla, I informed them I do not have that drug here and perhaps someone could go home and get it.  Mother stated she could go home and get it, but pt requ

## 2018-11-02 VITALS
OXYGEN SATURATION: 99 % | TEMPERATURE: 98 F | WEIGHT: 131 LBS | HEART RATE: 89 BPM | SYSTOLIC BLOOD PRESSURE: 120 MMHG | DIASTOLIC BLOOD PRESSURE: 60 MMHG | HEIGHT: 66 IN | RESPIRATION RATE: 18 BRPM | BODY MASS INDEX: 21.05 KG/M2

## 2018-12-07 ENCOUNTER — OFFICE VISIT (OUTPATIENT)
Dept: FAMILY MEDICINE CLINIC | Facility: CLINIC | Age: 48
End: 2018-12-07
Payer: COMMERCIAL

## 2018-12-07 VITALS
TEMPERATURE: 98 F | RESPIRATION RATE: 20 BRPM | BODY MASS INDEX: 19.93 KG/M2 | HEART RATE: 66 BPM | SYSTOLIC BLOOD PRESSURE: 118 MMHG | DIASTOLIC BLOOD PRESSURE: 60 MMHG | HEIGHT: 66 IN | WEIGHT: 124 LBS | OXYGEN SATURATION: 97 %

## 2018-12-07 DIAGNOSIS — J01.00 ACUTE MAXILLARY SINUSITIS, RECURRENCE NOT SPECIFIED: Primary | ICD-10-CM

## 2018-12-07 PROCEDURE — 99213 OFFICE O/P EST LOW 20 MIN: CPT | Performed by: NURSE PRACTITIONER

## 2018-12-07 RX ORDER — AMOXICILLIN 875 MG/1
875 TABLET, COATED ORAL 2 TIMES DAILY
Qty: 20 TABLET | Refills: 0 | Status: SHIPPED | OUTPATIENT
Start: 2018-12-07 | End: 2018-12-17

## 2018-12-07 NOTE — PROGRESS NOTES
CHIEF COMPLAINT:   Patient presents with:  Sinus Problem: sinus pressure, headache, bodyaches, ear pain, post nasal drip   Nausea: Nausea X 3 days, feeling dizziness, tired, weak      HPI:   Misha Turner is a 50year old female who presents for cold s ondansetron (ZOFRAN-ODT) 4 MG Oral Tablet Dispersible Take 1 tablet by mouth every 4 (four) hours as needed for Nausea. Disp: 10 tablet Rfl: 0   Clobetasol Propionate 0.05 % Apply Externally Liquid Apply  topically daily as needed.  Disp:  Rfl:       Past M LUNGS: denies shortness of breath or wheezing, See HPI  CARDIOVASCULAR: denies chest pain or palpitations   GI: denies N/V/C or abdominal pain  NEURO: + sinus headaches. No numbness or tingling in face.     EXAM:   /60   Pulse 66   Temp 97.7 °F (36.5 Sinusitis can often be managed with self-care. Self-care can keep sinuses moist and make you feel more comfortable. Remember to follow your doctor's instructions closely. This can make a big difference in getting your sinus problem under control.   Drink fl The patient is asked to f/u with PCP if sx's persist or worsen.

## 2019-08-06 ENCOUNTER — APPOINTMENT (OUTPATIENT)
Dept: CT IMAGING | Facility: HOSPITAL | Age: 49
End: 2019-08-06
Attending: EMERGENCY MEDICINE
Payer: COMMERCIAL

## 2019-08-06 PROCEDURE — 72125 CT NECK SPINE W/O DYE: CPT | Performed by: EMERGENCY MEDICINE

## 2019-08-06 PROCEDURE — 70450 CT HEAD/BRAIN W/O DYE: CPT | Performed by: EMERGENCY MEDICINE

## 2019-08-06 NOTE — ED NOTES
Pt dozing on and off, family at bedside. No acute distress noted. Pt and family updated of status.  Call light remains within reach

## 2019-08-06 NOTE — ED PROVIDER NOTES
Patient Seen in: [facility] Emergency Department    History   Patient presents with:  Dizziness (neurologic)    Stated Complaint: dizzy Pt state that she fall on sunday and now is dizzy     HPI  She is a 43-year-old woman who complains of headache f Performed by Sam Alfonso MD at [address] History    Tobacco Use      Smoking status: Former Smoker        Types: Cigarettes        Quit date: 2010        Years since quittin.6      Smokeless tobacco: Nev Musculoskeletal: Normal range of motion. She exhibits no edema or tenderness. Neurological: She is alert and oriented to person, place, and time. No cranial nerve deficit. She exhibits normal muscle tone.  Coordination normal.   Skin: Skin is warm and dry No evidence for acute fracture or alignment abnormality. Soft tissues appear intact. EKG: Rate 57, intervals per EKG report sinus bradycardia otherwise normal EKG    MDM       Patient was brought back to the examination room immediately.   The gaby

## 2019-08-06 NOTE — ED INITIAL ASSESSMENT (HPI)
Pt states yesterday she woke up and walked downstairs to get a drink. Pt states she woke up on the ground. Pt states scrapes to left knee, elbow and left ear. Pt states feels dizzy, \"feels foggy\".   Pt states vision is blurred

## 2019-10-12 ENCOUNTER — OFFICE VISIT (OUTPATIENT)
Dept: FAMILY MEDICINE CLINIC | Facility: CLINIC | Age: 49
End: 2019-10-12
Payer: COMMERCIAL

## 2019-10-12 VITALS
TEMPERATURE: 98 F | HEART RATE: 88 BPM | HEIGHT: 64.5 IN | WEIGHT: 121 LBS | BODY MASS INDEX: 20.4 KG/M2 | OXYGEN SATURATION: 99 % | SYSTOLIC BLOOD PRESSURE: 104 MMHG | RESPIRATION RATE: 16 BRPM | DIASTOLIC BLOOD PRESSURE: 62 MMHG

## 2019-10-12 DIAGNOSIS — J06.9 VIRAL URI WITH COUGH: Primary | ICD-10-CM

## 2019-10-12 PROCEDURE — 99213 OFFICE O/P EST LOW 20 MIN: CPT | Performed by: NURSE PRACTITIONER

## 2019-10-12 RX ORDER — QUETIAPINE 25 MG/1
TABLET, FILM COATED ORAL
Refills: 0 | COMMUNITY
Start: 2019-09-04 | End: 2020-06-04

## 2019-10-12 RX ORDER — FLUTICASONE PROPIONATE 50 MCG
1 SPRAY, SUSPENSION (ML) NASAL 2 TIMES DAILY
Qty: 1 BOTTLE | Refills: 1 | Status: SHIPPED | OUTPATIENT
Start: 2019-10-12 | End: 2019-11-11

## 2019-10-12 RX ORDER — BENZONATATE 200 MG/1
200 CAPSULE ORAL 3 TIMES DAILY PRN
Qty: 30 CAPSULE | Refills: 0 | Status: SHIPPED | OUTPATIENT
Start: 2019-10-12 | End: 2019-10-22

## 2019-10-12 NOTE — PROGRESS NOTES
HPI:   Yasmine Perez is a 52year old female who presents with ill symptoms for  3  days.  Patient reports sore throat, congestion, dry cough, cough is keeping pt up at night, chest pain from coughing, ear pain, sinus pain, mild SOB with coughing spells QUEtiapine Fumarate 25 MG Oral Tab, TK 1 TO 2 TS PO Q NIGHT PRN, Disp: , Rfl: 0  Fluticasone Propionate 0.05 % External Cream, APPLY TO THE AFFECTED AREA ON NECK AND FACE TWICE DAILY FOR 2 WEEKS AS NEEDED, Disp: , Rfl: 1  Betamethasone Dipropionate (DIPROS REVIEW OF SYSTEMS:   GENERAL: feels well otherwise, fatigued with illness  HEENT: congested, as above in HPI  LUNGS: occasional shortness of breath with exertion  CARDIOVASCULAR: denies chest pain on exertion  GI: no nausea or abdominal pain, appetite down You have a viral upper respiratory illness (URI), which is another term for the common cold. This illness is contagious during the first few days. It is spread through the air by coughing and sneezing.  It may also be spread by direct contact (touching the · Over-the-counter cold medicines will not shorten the length of time you’re sick, but they may be helpful for the following symptoms: cough, sore throat, and nasal and sinus congestion.  If you take prescription medicines, ask your healthcare provider or p

## 2019-11-14 ENCOUNTER — OFFICE VISIT (OUTPATIENT)
Dept: FAMILY MEDICINE CLINIC | Facility: CLINIC | Age: 49
End: 2019-11-14
Payer: COMMERCIAL

## 2019-11-14 VITALS
BODY MASS INDEX: 20.91 KG/M2 | HEART RATE: 115 BPM | HEIGHT: 64.5 IN | DIASTOLIC BLOOD PRESSURE: 60 MMHG | WEIGHT: 124 LBS | TEMPERATURE: 98 F | OXYGEN SATURATION: 99 % | SYSTOLIC BLOOD PRESSURE: 102 MMHG | RESPIRATION RATE: 18 BRPM

## 2019-11-14 DIAGNOSIS — J01.40 ACUTE NON-RECURRENT PANSINUSITIS: Primary | ICD-10-CM

## 2019-11-14 PROCEDURE — 99213 OFFICE O/P EST LOW 20 MIN: CPT | Performed by: NURSE PRACTITIONER

## 2019-11-14 RX ORDER — CYCLOBENZAPRINE HCL 10 MG
TABLET ORAL
Refills: 0 | COMMUNITY
Start: 2019-11-01 | End: 2020-03-16

## 2019-11-14 RX ORDER — AMOXICILLIN AND CLAVULANATE POTASSIUM 875; 125 MG/1; MG/1
1 TABLET, FILM COATED ORAL 2 TIMES DAILY
Qty: 20 TABLET | Refills: 0 | Status: SHIPPED | OUTPATIENT
Start: 2019-11-14 | End: 2019-11-24

## 2019-11-14 NOTE — PROGRESS NOTES
CHIEF COMPLAINT:   Patient presents with:  Headache: cough, sore throat, nasal congestion, chest hurts, ears hurt x 6 days    HPI:   Zoë Hernandez is a 52year old female who presents for sinus congestion for  1  months.  Was seen 1 month ago diagnosed • PANTOPRAZOLE SODIUM 40 MG Oral Tab EC TAKE 1 TABLET(40 MG) BY MOUTH DAILY BEFORE A MEAL 30 tablet 5   • HYDROcodone-acetaminophen  MG Oral Tab Take 1 tablet by mouth every 6 (six) hours as needed for Pain.      • BusPIRone HCl 10 MG Oral Tab Take 10 Family History   Problem Relation Age of Onset   • Other (Other) Mother         osteoarthritis   • Cancer Paternal Grandmother    • Hypertension Paternal Grandfather       Social History    Tobacco Use      Smoking status: Former Smoker        Types: Cigar ASSESSMENT:  Shar Bran is a 52year old female who presents with    ASSESSMENT: Acute non-recurrent pansinusitis  (primary encounter diagnosis)  1. Acute non-recurrent pansinusitis  - Amoxicillin-Pot Clavulanate 875-125 MG Oral Tab;  Take 1 tablet b · An expectorant with guaifenesin may help thin nasal mucus and help your sinuses drain fluids. · You can use an over-the-counter decongestant, unless a similar medicine was prescribed to you.  Nasal sprays work the fastest. Use one that contains phenyleph Here are steps you can take to help prevent an infection:  · Keep good hand washing habits. · Don’t have close contact with people who have sore throats, colds, or other upper respiratory infections. · Don’t smoke, and stay away from secondhand smoke.   ·

## 2019-12-06 ENCOUNTER — HOSPITAL ENCOUNTER (EMERGENCY)
Facility: HOSPITAL | Age: 49
Discharge: HOME | End: 2019-12-06
Attending: EMERGENCY MEDICINE
Payer: COMMERCIAL

## 2019-12-06 ENCOUNTER — APPOINTMENT (OUTPATIENT)
Dept: GENERAL RADIOLOGY | Facility: HOSPITAL | Age: 49
End: 2019-12-06
Attending: EMERGENCY MEDICINE
Payer: COMMERCIAL

## 2019-12-06 VITALS
OXYGEN SATURATION: 100 % | SYSTOLIC BLOOD PRESSURE: 135 MMHG | HEART RATE: 60 BPM | RESPIRATION RATE: 19 BRPM | WEIGHT: 123 LBS | DIASTOLIC BLOOD PRESSURE: 86 MMHG | TEMPERATURE: 98 F | HEIGHT: 65 IN | BODY MASS INDEX: 20.49 KG/M2

## 2019-12-06 DIAGNOSIS — M54.40 BACK PAIN OF LUMBAR REGION WITH SCIATICA: Primary | ICD-10-CM

## 2019-12-06 PROCEDURE — 99283 EMERGENCY DEPT VISIT LOW MDM: CPT

## 2019-12-06 PROCEDURE — 74018 RADEX ABDOMEN 1 VIEW: CPT | Performed by: EMERGENCY MEDICINE

## 2019-12-06 PROCEDURE — 99284 EMERGENCY DEPT VISIT MOD MDM: CPT

## 2019-12-06 PROCEDURE — 96372 THER/PROPH/DIAG INJ SC/IM: CPT

## 2019-12-06 RX ORDER — METHYLPREDNISOLONE 4 MG/1
TABLET ORAL
Qty: 1 PACKAGE | Refills: 0 | Status: SHIPPED | OUTPATIENT
Start: 2019-12-06 | End: 2020-03-16 | Stop reason: ALTCHOICE

## 2019-12-06 RX ORDER — MAGNESIUM CARB/ALUMINUM HYDROX 105-160MG
296 TABLET,CHEWABLE ORAL ONCE
Qty: 296 ML | Refills: 0 | Status: SHIPPED | OUTPATIENT
Start: 2019-12-06 | End: 2019-12-06

## 2019-12-06 RX ORDER — LIDOCAINE 50 MG/G
1 PATCH TOPICAL EVERY 24 HOURS
Qty: 10 PATCH | Refills: 0 | Status: SHIPPED | OUTPATIENT
Start: 2019-12-06 | End: 2021-05-06

## 2019-12-06 RX ORDER — HYDROMORPHONE HYDROCHLORIDE 1 MG/ML
1 INJECTION, SOLUTION INTRAMUSCULAR; INTRAVENOUS; SUBCUTANEOUS EVERY 30 MIN PRN
Status: DISCONTINUED | OUTPATIENT
Start: 2019-12-06 | End: 2019-12-07

## 2019-12-07 NOTE — ED PROVIDER NOTES
58-year-old female who had been seen in the immediate care presents to the ER requesting stronger medications. She is states she is having severe back pain. She is seen by me as well as by the nurse practitioner.   She has no loss of bowel or bladder cont

## 2019-12-07 NOTE — ED INITIAL ASSESSMENT (HPI)
PT c/o back pain starting on lower L side, radiating down L leg and up L side of spine. PT has chronic back pain resulting from a fall years ago, and states she has \"disc problems\".

## 2019-12-07 NOTE — ED PROVIDER NOTES
Patient Seen in: [facility] Emergency Department      History   Patient presents with:  Back Pain    Stated Complaint: low back pain, from [facility] immediate care     40-year-old female presents today with exacerbation of chronic lower back pain.   Was se [facility]   • LUMBAR EPIDURAL N/A 3/31/2017    Performed by Lulu Chao MD at [address]   •      • OTHER SURGICAL HISTORY      wisdom teeth   • TRANSFORAMINAL EPIDURAL - LUMBAR Left 3/6/2018    Performed by Leonidas Pepe General: Skin is warm and dry. Neurological:      Mental Status: She is alert and oriented to person, place, and time. Comments: Good dorsiflexion bilateral great toes. Negative cauda equina syndrome.   Normal straight leg raise bilaterally but with Prescribed:  Current Discharge Medication List    START taking these medications    lidocaine 5 % External Patch  Place 1 patch onto the skin daily.   Qty: 10 patch Refills: 0    methylPREDNISolone (MEDROL) 4 MG Oral Tablet Therapy Pack  Dosepack: take as d

## 2023-09-11 ENCOUNTER — LAB REQUISITION (OUTPATIENT)
Age: 53
End: 2023-09-11
Payer: COMMERCIAL

## 2023-09-11 DIAGNOSIS — Z12.11 SCREENING FOR COLON CANCER: ICD-10-CM

## 2023-09-11 PROCEDURE — 88305 TISSUE EXAM BY PATHOLOGIST: CPT | Performed by: INTERNAL MEDICINE

## 2024-04-08 ENCOUNTER — OFFICE VISIT (OUTPATIENT)
Dept: FAMILY MEDICINE CLINIC | Facility: CLINIC | Age: 54
End: 2024-04-08
Payer: COMMERCIAL

## 2024-04-08 ENCOUNTER — TELEPHONE (OUTPATIENT)
Dept: PODIATRY CLINIC | Facility: CLINIC | Age: 54
End: 2024-04-08

## 2024-04-08 ENCOUNTER — HOSPITAL ENCOUNTER (EMERGENCY)
Age: 54
Discharge: HOME | End: 2024-04-08
Attending: EMERGENCY MEDICINE
Payer: COMMERCIAL

## 2024-04-08 ENCOUNTER — APPOINTMENT (OUTPATIENT)
Dept: GENERAL RADIOLOGY | Age: 54
End: 2024-04-08
Attending: PHYSICIAN ASSISTANT
Payer: COMMERCIAL

## 2024-04-08 VITALS
WEIGHT: 125 LBS | OXYGEN SATURATION: 92 % | HEIGHT: 65 IN | SYSTOLIC BLOOD PRESSURE: 128 MMHG | HEART RATE: 72 BPM | DIASTOLIC BLOOD PRESSURE: 108 MMHG | TEMPERATURE: 98 F | BODY MASS INDEX: 20.83 KG/M2 | RESPIRATION RATE: 18 BRPM

## 2024-04-08 VITALS
SYSTOLIC BLOOD PRESSURE: 110 MMHG | DIASTOLIC BLOOD PRESSURE: 80 MMHG | HEIGHT: 64 IN | OXYGEN SATURATION: 100 % | RESPIRATION RATE: 18 BRPM | HEART RATE: 80 BPM | BODY MASS INDEX: 21.34 KG/M2 | TEMPERATURE: 98 F | WEIGHT: 125 LBS

## 2024-04-08 DIAGNOSIS — M79.5 FOREIGN BODY (FB) IN SOFT TISSUE: ICD-10-CM

## 2024-04-08 DIAGNOSIS — S99.922A TOE INJURY, LEFT, INITIAL ENCOUNTER: Primary | ICD-10-CM

## 2024-04-08 DIAGNOSIS — Z02.9 ADMINISTRATIVE ENCOUNTER: ICD-10-CM

## 2024-04-08 DIAGNOSIS — T14.8XXA FOREIGN BODY IN SKIN: ICD-10-CM

## 2024-04-08 DIAGNOSIS — S99.922A INJURY OF TOE ON LEFT FOOT, INITIAL ENCOUNTER: Primary | ICD-10-CM

## 2024-04-08 DIAGNOSIS — S97.102A CRUSH INJURY, TOE, LEFT, INITIAL ENCOUNTER: ICD-10-CM

## 2024-04-08 PROCEDURE — 99284 EMERGENCY DEPT VISIT MOD MDM: CPT

## 2024-04-08 PROCEDURE — 99283 EMERGENCY DEPT VISIT LOW MDM: CPT

## 2024-04-08 PROCEDURE — 10120 INC&RMVL FB SUBQ TISS SMPL: CPT

## 2024-04-08 PROCEDURE — 73660 X-RAY EXAM OF TOE(S): CPT | Performed by: PHYSICIAN ASSISTANT

## 2024-04-08 RX ORDER — ACETAMINOPHEN 500 MG
1000 TABLET ORAL ONCE
Status: COMPLETED | OUTPATIENT
Start: 2024-04-08 | End: 2024-04-08

## 2024-04-08 RX ORDER — OLANZAPINE 10 MG/1
10 TABLET ORAL NIGHTLY
COMMUNITY
Start: 2022-03-29

## 2024-04-08 RX ORDER — BUPRENORPHINE HYDROCHLORIDE AND NALOXONE HYDROCHLORIDE DIHYDRATE 2; .5 MG/1; MG/1
TABLET SUBLINGUAL
COMMUNITY
Start: 2023-12-19

## 2024-04-08 RX ORDER — FEXOFENADINE HCL 60 MG/1
60 TABLET, FILM COATED ORAL DAILY
COMMUNITY

## 2024-04-08 RX ORDER — CEFADROXIL 500 MG/1
500 CAPSULE ORAL 2 TIMES DAILY
Qty: 14 CAPSULE | Refills: 0 | Status: SHIPPED | OUTPATIENT
Start: 2024-04-08 | End: 2024-04-15

## 2024-04-08 NOTE — TELEPHONE ENCOUNTER
Patient being referred from ED for foreign body of foot after cactus fell on her toe. Patient currently in ER.    Please advise if we could add on this week to assess foot?

## 2024-04-08 NOTE — ED PROVIDER NOTES
Patient Seen in: [facility] In [place]      History     Chief Complaint   Patient presents with    FB in Skin     Stated Complaint: cactus needle stuck in foot    Subjective:   HPI    CHIEF COMPLAINT: Left great toe injury     HISTORY OF PRESENT ILLNESS: Patient is a 53-year-old female who presents for evaluation of a left great toe injury.  She states yesterday she dropped potted plant on it.  The plant was a cactus.  She thinks there is a cactus needle stuck in the great toe.  Went to an urgent care yesterday and they said she needs an x-ray so they can help her.  Patient has noted redness, pain and swelling.  She has been ambulatory but walking on it makes it worse.     REVIEW OF SYSTEMS:  Constitutional: no fever, no chills  Eyes: no discharge  ENT: no sore throat  Cardiovascular: no chest pain, no palpitations  Respiratory: no cough, no shortness of breath  Gastrointestinal: no abdominal pain, no vomiting  Genitourinary: no hematuria  Musculoskeletal: no back pain  Skin: no rashes  Neurological: no headache     Otherwise a complete review of systems was obtained and other than the HPI was negative     The patient's medication list, past medical history and social history elements is as listed in today's nurse's notes are reviewed and agree. The patient's family history is reviewed and is noncontributory to the presenting problem, except as indicated as above.    Objective:   Past Medical History:   Diagnosis Date    ADHD     Anxiety     Arthritis     Back pain     Bipolar affective (HCC)     Depression     Fibromyalgia     Lumbar herniated disc     Psoriasis     PTSD (post-traumatic stress disorder)     Vertigo               Past Surgical History:   Procedure Laterality Date    INJECTION, ANESTHETIC/STEROID, PARAVERTEBRAL FACET JOINT/NERVE; LUMBAR/SACRAL, ADD'L LEVEL  2020    L2-L5          OTHER SURGICAL HISTORY      wisdom teeth                Social History     Socioeconomic  History    Marital status: Single    Number of children: 1   Occupational History    Occupation: [employer]   Tobacco Use    Smoking status: Light Smoker     Types: Cigarettes     Last attempt to quit: 2010     Years since quittin.3    Smokeless tobacco: Never   Vaping Use    Vaping Use: Never used   Substance and Sexual Activity    Alcohol use: Yes     Alcohol/week: 2.0 standard drinks of alcohol     Types: 1 Glasses of wine, 1 Cans of beer per week     Comment: soc     Drug use: No    Sexual activity: Not Currently   Other Topics Concern    Seat Belt Yes    Sleep Concern No              Review of Systems    Positive for stated complaint: cactus needle stuck in foot  Other systems are as noted in HPI.  Constitutional and vital signs reviewed.      All other systems reviewed and negative except as noted above.    Physical Exam     ED Triage Vitals [24 0848]   /70   Pulse 70   Resp 18   Temp 97.6 °F (36.4 °C)   Temp src Temporal   SpO2 100 %   O2 Device None (Room air)       Current:BP (!) 128/108   Pulse 72   Temp 97.9 °F (36.6 °C) (Temporal)   Resp 18   Ht 165.1 cm (5' 5\")   Wt 56.7 kg   SpO2 92%   BMI 20.80 kg/m²         Physical Exam    Vital signs and nursing notes reviewed  General Appearance: No acute distress  Neurological:  A&Ox3.  Psychiatric: calm and cooperative  Respiratory: Normal effort  Cardiovascular: RRR, S1/S2, no m/c/r  Musculoskeletal: Extremities are symmetrical, full range of motion  Skin:  warm and dry, no rashes.   Left great toe: On the medial aspect of the left great toe there is mild erythema.  Tenderness to palpation.  Using a light transillumination performed.  There is a small less than 5 mm splinter type lesion under the skin on the medial aspect of the left great toe.    Area was cleansed.  1% plain lidocaine injected.  Once adequate anesthesia was obtained a very superficial nick in the skin was made using an 11 blade.  Wound was explored.  There does  appear to be black splinter which I am unable to grasp with a splinter forceps.  Patient tolerated exploration well.    ED Course   Labs Reviewed - No data to display          XR TOE(S) (MIN 2 VIEWS), LEFT 1ST (CPT=73660)    Result Date: 4/8/2024  PROCEDURE:  XR TOE(S) (MIN 2 VIEWS), LEFT 1ST (CPT=73660)  TECHNIQUE:  Three views were obtained.  COMPARISON:  None.  INDICATIONS:  dropped a flower pot on the toe, now thinks a cactus needle stuck in foot  PATIENT STATED HISTORY: (As transcribed by Technologist)  Patient states that a potted catcus fell on her left foot last night and she is complaining of pain and redness in the great toe on the left foot.    FINDINGS:  Osseous structures are intact. Joint spaces are preserved. No dislocation.  No radiopaque foreign body appreciated.  There is soft tissue swelling surrounding the 1st digit.  If osteomyelitis is of high clinical concern, recommend MRI for further evaluation.            CONCLUSION:  No acute visible fracture.  See above description.    LOCATION:  [facility]   Dictated by (CST): Dionne Jacobs MD on 4/08/2024 at 9:46 AM     Finalized by (CST): Dionne Jacobs MD on 4/08/2024 at 9:47 AM        I personally reviewed the x-ray images.  No fracture or dislocation appreciated.  No foreign body noted.      Differential diagnosis is fracture, foreign body, contusion       MDM      This is a well-appearing 53-year-old female who comes in with a left great toe injury.  X-rays were negative for fracture or dislocation.  No foreign body appreciated on x-ray.  On physical exam it did appear that there was a splinter type lesion in the area of pain.  Wound was explored and unable to retrieve the foreign body.  I reached out to podiatry.  They are making arrangements to have the patient followed up later this week.  They will call the patient once they have arranged a follow-up appointment for her.  If there are new, changing or worsening symptoms in the interim she should return  for reevaluation.  Duricef was prescribed for wound prophylaxis.  Can take Tylenol as needed for pain.  If there are any new, changing or worsening symptoms go to the ER.  Patient voiced understanding to the treatment plan.  All questions answered.  Patient was seen and examined with Dr. Liang, who agrees with the disposition and plan.                                   Medical Decision Making  Amount and/or Complexity of Data Reviewed  Radiology: ordered and independent interpretation performed. Decision-making details documented in ED Course.    Risk  Prescription drug management.        Disposition and Plan     Clinical Impression:  1. Injury of toe on left foot, initial encounter    2. Foreign body in skin         Disposition:  Discharge  4/8/2024 11:30 am    Follow-up:  Hugo Hollis, ANTONIO  [address] [address] [phone]    Follow up            Medications Prescribed:  Discharge Medication List as of 4/8/2024 11:45 AM        START taking these medications    Details   cefadroxil 500 MG Oral Cap Take 1 capsule (500 mg total) by mouth 2 (two) times daily for 7 days., Normal, Disp-14 capsule, R-0

## 2024-04-08 NOTE — ED PROVIDER NOTES
I reviewed that chart and discussed the case.  I have examined the patient and noted small open area lateral big toe no definite foreign body noted.  Able to flex extend.  Good capillary refill.  Transilluminated no definite foreign body.  X-ray toe No acute visible fracture.  See above description.       Independent reviewed by myself, no foreign body noted.    Follow-up for further evaluation podiatry.  Return if new or worse symptoms.  Antibiotics as prescribed.    I did consider foreign body, cellulitis, toe fracture     I did the substantive portion of the medical decision making.     I agree with the following clinical impression(s):  Injury of toe on left foot, initial encounter  (primary encounter diagnosis)   Foreign body in skin.     I agree with the plan as noted.

## 2024-04-08 NOTE — DISCHARGE INSTRUCTIONS
Keep the wound clean and dry.  Take the antibiotic as directed.  Follow-up with podiatry.  They will call to let you know what time your appointment is.    Follow up with your primary doctor.     If you have new, changing or worsening symptoms, please go directly to the ER.

## 2024-04-08 NOTE — PROGRESS NOTES
Arabella Hobbs is a 53 year old female who presents to [facility] with c/o pain to L great toe x 1 day.  Flower pot/cactus fell on foot from windowsill while closing blinds  Pain/swelling with bruising, suspected FB with cactus spice lateral to medial nailfold.  Exquisitely tender, pain with weight bearing.  Tried soaking, icing, elevation without relief.  PCP referred her to ED last evening, waited for [facility] to open this mornin.   Last TDAP suspected appx 5 years ago.   Denies paresthesias, no fever/chills/sweats, no purulent drainage, no other injuries.     OBJECTIVE:   /80   Pulse 80   Temp 97.6 °F (36.4 °C) (Oral)   Resp 18   Ht 5' 4\" (1.626 m)   Wt 125 lb (56.7 kg)   SpO2 100%   BMI 21.46 kg/m²   GENERAL A & O in nad, appears uncomfortable holding foot, antalgic gait.   MS:  (+) erythema L distal medial L great toe with pinpoint ecchymosis medial to nailfold reportedly puncture wound from cactus spike, marked ttp to lateral 1DLF with light palpation including IP and distal phalanx, cap refill < 2 sec, sensation intact, no warmth to touch, no deformity, no pain over MTP.       Plan    ICD-10-CM    1. Toe injury, left, initial encounter  S99.922A       2. Crush injury, toe, left, initial encounter  S97.102A       3. Foreign body (FB) in soft tissue  M79.5       4. Administrative encounter  Z02.9          Referred to higher level of care for further evaluation/management including imaging and possible FB removal with anesthetic. Discussed IC/ED for further management, pt prefers ED.   Pt voiced understanding and agreement with plan of care.  Discharged in stable condition to ED via private car.        Electronically signed,   Kimber Padilla PA-C,  4/8/2024, 8:19 AM

## 2024-04-08 NOTE — TELEPHONE ENCOUNTER
Nurse calling on behalf of patient to schedule appointment for Foreign body in great toe L foot. Patient is in a lot of pain. Nurse would like same day or next day appointment, Please advise.

## (undated) NOTE — ED AVS SNAPSHOT
Cheree Frankel   MRN: [id number]    Department:  [facility] Emergency Department   Date of Visit:  8/5/2019           Disclosure     Insurance plans vary and the physician(s) referred by the ER may not be covered by your plan.  Please contact yo tell this physician (or your personal doctor if your instructions are to return to your personal doctor) about any new or lasting problems. The primary care or specialist physician will see patients referred from the [facility] Emergency Department.  Cheo Anne

## (undated) NOTE — ED AVS SNAPSHOT
Jovanny Kidney   MRN: [id number]    Department:  THE [facility] Emergency Department in [place]   Date of Visit:  4/15/2018           Disclosure     Insurance plans vary and the physician(s) referred by the ER may not be covered by your plan.  Please cont tell this physician (or your personal doctor if your instructions are to return to your personal doctor) about any new or lasting problems. The primary care or specialist physician will see patients referred from the [facility] Emergency Department.  Dong Victor

## (undated) NOTE — LETTER
November 10, 2017    Patient: Nelson Fuentes   Date of Visit: 11/10/2017       To Whom It May Concern:    Nelson Fuentes was seen and treated in our emergency department on 11/10/2017.   She may return to work on 11/14/2017    If you have any ques

## (undated) NOTE — LETTER
Date: 10/12/2019    Patient Name: Arden Hearn          To Whom it may concern: The above patient was seen at the [facility] for treatment of a medical condition. This patient should be excused from attending work through 10/15/19.

## (undated) NOTE — LETTER
Date: 12/7/2018    Patient Name: Bhavik Coleman          To Whom it may concern: This letter has been written at the patient's request. The above patient was seen at the [facility] for treatment of a medical condition.     This patient s

## (undated) NOTE — LETTER
[facility] CARE IN [place] [id number] [address]  Dept: [phone]  Dept Fax: [phone]      June 23, 2017    Patient: Huy Matamoros   Date of Visit: 6/23/2017       To Whom It May Concern:    Huy Matamoors was seen a

## (undated) NOTE — LETTER
Date & Time: 6/12/2018, 8:42 PM  Patient: Gee Sears  Encounter Provider(s):    Tianna Shafer MD       To Whom It May Concern:    Gee Sears was seen and treated in our department on 6/12/2018. She can return to work on 6/14/18.     If you

## (undated) NOTE — ED AVS SNAPSHOT
[facility] Emergency Department    [place]  [address] [place] [place] [address]    Phone:  [phone]    Fax:  [phone]   MRN: [id number]    Department:  [facility] Emergency Department   Date of Visit:  3/ You can get these medications from any pharmacy     Bring a paper prescription for each of these medications    - HYDROcodone-acetaminophen 5-325 MG Tabs            Discharge References/Attachments     BACK CONTUSION (ENGLISH)    FALL, MECHANICAL (ENGLISH) IF THERE IS ANY CHANGE OR WORSENING OF YOUR CONDITION, CALL YOUR PRIMARY CARE PHYSICIAN AT ONCE OR RETURN IMMEDIATELY TO THE EMERGENCY DEPARTMENT.     If you have been prescribed any medication(s), please fill your prescription right away and begin taking t Patient [address] to help you get signed up for insurance coverage. Patient [address] is a Federal Navigator program that can help with your Affordable Care Act coverage, as well as all types of Medicaid plans.   To get signed up and covere PROCEDURE:  XR ROUTINE THORACIC SPINE (3 VIEWS) (CPT=72072)     TECHNIQUE:  AP, lateral, and swimmer's views of the thoracic spine were obtained. COMPARISON:  [facility] , XR LUMBAR SPINE (MIN 4 VIEWS) (CPT=72110), 3/11/2017, 23:14.   [facility] , XR CERVICAL respect to C5 most likely due to   degenerative change. Vertebral body heights maintained. Disc space narrowing at the C5-6 and C6-7 level with marginal osteophytes and slight bilateral neural foraminal narrowing consistent with degenerative change. Summaries. If you've been to the Emergency Department or your doctor's office, you can view your past visit information in [employer] by going to Visits < Visit Summaries. [employer] questions? Call [phone] for help.   [employer] is NOT to be used for urge

## (undated) NOTE — ED AVS SNAPSHOT
Cheree Frankel   MRN: [id number]    Department:  THE [facility] Emergency Department in [place]   Date of Visit:  11/8/2017           Disclosure     Insurance plans vary and the physician(s) referred by the ER may not be covered by your plan.  Please cont If you have been prescribed any medication(s), please fill your prescription right away and begin taking the medication(s) as directed    If the emergency physician has read X-rays, these will be re-interpreted by a radiologist.  If there is a significant

## (undated) NOTE — LETTER
Date & Time: 8/6/2019, 4:50 AM  Patient: Crystal Borja  Encounter Provider(s):    Brittni Jauregui MD       To Whom It May Concern:    Crystal Borja was seen and treated in our department on 8/5/2019.  She may return to work on 8/9/19    If you hav

## (undated) NOTE — LETTER
November 8, 2017    Patient: Wily Hunt   Date of Visit: 11/8/2017       To Whom It May Concern:    Wily Hunt was seen and treated in our emergency department on 11/8/2017.   She may return to work on 11/10/2017    If you have any questio

## (undated) NOTE — ED AVS SNAPSHOT
[facility] in [address] [place] [address] Drive,[address] Floor    [address] [facility]    Phone:  [phone]    Fax:  [phone]           Zachary Bassett   MRN: [id number]    Department:  [facility]   Date of Visit:  6/23/2017 previously prescribed.     Discharge References/Attachments     BACK PAIN, RELIEVING (ENGLISH)    BACK PAIN (ACUTE OR CHRONIC) (ENGLISH)      Disclosure     Insurance plans vary and the physician(s) referred by the Immediate Care may not be covered by your IF THERE IS ANY CHANGE OR WORSENING OF YOUR CONDITION, CALL YOUR PRIMARY CARE PHYSICIAN AT ONCE OR GO TO THE EMERGENCY DEPARTMENT.     If you have been prescribed any medication(s), please fill your prescription right away and begin taking the medication(s) Patient [address] to help you get signed up for insurance coverage. Patient [address] is a Federal Navigator program that can help with your Affordable Care Act coverage, as well as all types of Medicaid plans.   To get signed up and covere

## (undated) NOTE — LETTER
Date: 11/14/2019    Patient Name: Thierry Brochure          To Whom it may concern: This letter has been written at the patient's request. The above patient was seen at the [facility] for treatment of a medical condition.     This patient

## (undated) NOTE — LETTER
[facility] CARE IN [place]  [address] [address]  Dept: [phone]  Dept Fax: [phone]      September 29, 2017    Patient: Tee Brito   Date of Visit: 9/29/2017       To Whom It May Concern:    Tee Brito was s

## (undated) NOTE — ED AVS SNAPSHOT
[facility] Emergency Department    [place]  [address] [place] [address]    Phone:  [phone]    Fax:  [phone]   MRN: [id number]    Department:  [facility] Emergency Department   Date of Visit:  3/ IF THERE IS ANY CHANGE OR WORSENING OF YOUR CONDITION, CALL YOUR PRIMARY CARE PHYSICIAN AT ONCE OR RETURN IMMEDIATELY TO THE EMERGENCY DEPARTMENT.     If you have been prescribed any medication(s), please fill your prescription right away and begin taking t

## (undated) NOTE — ED AVS SNAPSHOT
THE [facility] Emergency Department in [address]    Phone:  [phone]    Fax:  [address]   MRN: [id number]    Department:  THE [facility] Emergency Department in [place]   Date of Visi coverage for follow-up care and referrals. [address] [facility] [employer] [place] [phone]  Pediatric [phone] Emergency Department   [phone]       To Check ER Wait Times:  TEXT 'ERwait' to 12582      Click [email or web address] will be contacted. Please make sure we have your correct phone number before you leave. After you leave, you should follow the attached instructions. I have read and understand the instructions given to me by my caregivers.         24[facility] Pharm[facility] XR HIP W OR WO PELVIS 2 OR 3 VIEWS, LEFT (CPT=73502) (Final result) Result time:  01/16/17 15:08:58    Final result    Impression:    CONCLUSION:  Negative for fracture. Dictated by: Leigh Rosenthal MD on 1/16/2017 at 15:06       Approved by:  Al You can access your MyChart to more actively manage your health care and view more details from this visit by going to [email or web address].   If you've recently had a stay at the Hospital you can access your discharge instructions in [address] by [email or web address]

## (undated) NOTE — ED AVS SNAPSHOT
Mustaphaon Ted   MRN: [id number]    Department:  [facility] Emergency Department in [place]   Date of Visit:  6/12/2018           Disclosure     Insurance plans vary and the physician(s) referred by the ER may not be covered by your plan.  Please cont tell this physician (or your personal doctor if your instructions are to return to your personal doctor) about any new or lasting problems. The primary care or specialist physician will see patients referred from the [facility] Emergency Department.  Bj Leblanc

## (undated) NOTE — ED AVS SNAPSHOT
Cheree Frankel   MRN: [id number]    Department:  [facility] Emergency Department in [place]   Date of Visit:  11/10/2017           Disclosure     Insurance plans vary and the physician(s) referred by the ER may not be covered by your plan.  Please con If you have been prescribed any medication(s), please fill your prescription right away and begin taking the medication(s) as directed    If the emergency physician has read X-rays, these will be re-interpreted by a radiologist.  If there is a significant

## (undated) NOTE — ED AVS SNAPSHOT
Tee Brito   MRN: [id number]    Department:  [facility] Emergency Department   Date of Visit:  12/6/2019           Disclosure     Insurance plans vary and the physician(s) referred by the ER may not be covered by your plan.  Please contact y tell this physician (or your personal doctor if your instructions are to return to your personal doctor) about any new or lasting problems. The primary care or specialist physician will see patients referred from the [facility] Emergency Department.  Zia Madison

## (undated) NOTE — LETTER
Date & Time: 4/15/2018, 6:11 AM  Patient: Don Chavis  Encounter Provider(s):    Princess Johnson,        To Whom It May Concern:    Don Chavis was seen and treated in our department on 4/15/2018.  She should not return to work until 04/17/2

## (undated) NOTE — ED AVS SNAPSHOT
Alyssa Casrto   MRN: [id number]    Department:  [facility] Emergency Department in [place]   Date of Visit:  10/30/2017           Disclosure     Insurance plans vary and the physician(s) referred by the ER may not be covered by your plan.  Please con If you have been prescribed any medication(s), please fill your prescription right away and begin taking the medication(s) as directed    If the emergency physician has read X-rays, these will be re-interpreted by a radiologist.  If there is a significant

## (undated) NOTE — ED AVS SNAPSHOT
THE [facility] Emergency Department in [address]    Phone:  [phone]    Fax:  [address]   MRN: [id number]    Department:  THE [facility] Emergency Department in [place]   Date of Visi IF THERE IS ANY CHANGE OR WORSENING OF YOUR CONDITION, CALL YOUR PRIMARY CARE PHYSICIAN AT ONCE OR RETURN IMMEDIATELY TO THE EMERGENCY DEPARTMENT.     If you have been prescribed any medication(s), please fill your prescription right away and begin taking t